# Patient Record
(demographics unavailable — no encounter records)

---

## 2025-07-08 NOTE — PHYSICAL EXAM
[2] : was Alek stage 2 [Scant] : scant [___] : [unfilled] [Goiter] : no goiter [Murmur] : no murmurs [2/6 Ejection Systolic Murmur] : no ejection systolic murmur  [Normal for Age] : was abnormal for age

## 2025-07-08 NOTE — CONSULT LETTER
[Dear  ___] : Dear  [unfilled], [Consult Letter:] : I had the pleasure of evaluating your patient, [unfilled]. [Please see my note below.] : Please see my note below. [Consult Closing:] : Thank you very much for allowing me to participate in the care of this patient.  If you have any questions, please do not hesitate to contact me. [Sincerely,] : Sincerely, [FreeTextEntry3] : Americo Key MD

## 2025-07-08 NOTE — REVIEW OF SYSTEMS
[Rash] : no rash [Skin Lesions] : no skin lesions [Joint Pains] : no arthralgias [Joint Swelling] : no joint swelling [Back Pain] : ~T no back pain [Muscle Aches] : no muscle aches [Chest Pain] : no chest pain [Eye Discharge] : no eye discharge [Redness] : no redness [Tachypnea] : no tachypnea [Cough] : no cough [Vomiting] : no vomiting [Diarrhea] : no diarrhea [Abdominal Pain] : no abdominal pain [Constipation] : no constipation [Emotional Problems] : no ~T emotional problems [Nasal Stuffiness] : no nasal congestion [Sore Throat] : no sore throat [Fainting] : no syncope [Headache] : no headache [Dizziness] : no dizziness [Cold Intolerance] : cold tolerant [Heat Intolerance] : heat tolerant

## 2025-07-08 NOTE — ASSESSMENT
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  Premature adrenarche is a condition, which we classically consider as mild and benign.  In such cases, it is important to make sure that this adrenarche is not heralding the onset of true central precocious puberty.  If there is no evidence of thelarche or growth spurt, our index of suspicion that this is in true central puberty is rather low. We would still like to screen for androgen excess and ordered androstenedione, a DHEA, DHEAS, and a free testosterone level.  Nonclassical CAH can also present outside the  period with premature adrenarche, 17-hydroxypregnenolone, and 17-hydroxyprogesterone assessed for it.  Bone age to assess skeletal maturity.  If there are markedly elevated levels of androgens, we would like to get an ultrasound to assess for an adrenal tumor producing excessive androgens.    We explained that in most young children before they enter true puberty, there may be signs of androgen effects such as pubic hair, and body odor that are independent of the activation of central puberty. This may be due to an increase in the adrenal androgens or enhanced sensitivity to normal levels of androgens. As you may know, gonadotropins are the hormones that bring on the activation of the pituitary gonadal axis and bring on true central puberty.   Plan:  Obtain lab work now before 9 am.  Obtain bone age now.   Will discuss results with dad through telephone visit.   16.1

## 2025-07-08 NOTE — HISTORY OF PRESENT ILLNESS
[FreeTextEntry2] :  COLIN JAMES 9 year old male referred by Dr. Samaniego   for early pubertal development.   Parents are present and concerned about early development of pubic hair. Father reports that he started developing pubic hair from 9/2024 and body odor from 12/2024

## 2025-07-21 NOTE — QUALITY MEASURES
[Classification of primary headache syndrome based on latest version of International Classification of  Headache Disorders was performed] : Classification of primary headache syndrome based on latest version of International Classification of Headache Disorders was performed: Yes [Functional disability based on clinical history and/or age appropriate disability scale assessed] : Functional disability based on clinical history and/or age appropriate disability scale assessed: Yes [Overuse of OTC and prescribed analgesics assessed] : Overuse of OTC and prescribed analgesics assessed: Yes [Lifestyle factors including diet, exercise and sleep hygiene discussed] : Lifestyle factors including diet, exercise and sleep hygiene discussed: Yes [Referral to behavioral health for frequent headaches discussed] : Referral to behavioral health for frequent headaches discussed: Yes [Treatment plan for headache including  pharmacological (abortive and preventive) and nonpharmacological (nutraceutical and bio-behavioral) interventions] : Treatment plan for headache including  pharmacological (abortive and preventive) and nonpharmacological (nutraceutical and bio-behavioral) interventions: Yes [Snore at night?] : Does your child snore at night? No [Complain of daytime sleepiness?] : Does your child complain of daytime sleepiness? No [SleepDisorders] : sleepwalking, sleep talking, night terrors

## 2025-07-21 NOTE — ASSESSMENT
[FreeTextEntry1] : Tramaine is a 9 year old, RH, male with premature adrenarche seen by Endocrine on 7/8/25 with slight vitamin D insufficiency (Vitamin D 29.7 on 7/19/25) who presents to Neurology for their initial visit with concerns of headaches, sleep disturbances (sleepwalking and night terrors), and syncope. Although patient's neurological exam is non-focal and meeting all developmental milestones which is reassuring there are red flags of headaches waking patient up from sleep and lack of family history of headaches so will obtain brain MRI to rule out structural lesions. Obtain ferritin levels. Father counseled to bring Ophthalmologist records for dilated fundoscopic exam to rule out vision issues leading to headaches and previous Cardiology records for syncopal events not related to headaches. Discussed monitoring for any additional pre-fainting episodes or new red flag symptoms. Additionally, the patient is advised to avoid known triggers, lie down at the first sign of symptoms to try prevent fainting, moderate exercise training, eating a higher salt diet, to help keep up blood volume, drinking plenty of fluids, to maintain blood volume, and wearing compression stockings. Counseled on recommend lifestyle modifications and vitamin B2 and magnesium supplementation to help manage his headaches. Written educational materials were provided. Recommended psychiatrist and therapists to help with mood and stress management. Written psych, CBT, and biofeedback resources provided. Counseled on Sleep Hygiene: stick to the same bedtime and wake time every day, even on weekends. Follow up in 1-2 months, or sooner if symptoms worsen.  I spent a total of 75 minutes on the date of the encounter chart reviewing, evaluating, coordination of care, counseling, and treating the patient.

## 2025-07-21 NOTE — PLAN
[FreeTextEntry1] : - Therapeutic Interventions: Recommend keeping a headache diary, the sleep schedule consistent, ensuring adequate hydration and maintaining a balanced diet. Also recommend taking Magnesium 100 mg BID (200 mg daily) and Vitamin B2 supplement 100 mg BID (200 mg daily) with food. - Counseled on not using abortives (Tylenol/Motrin) for no more than 2x/week. - Diagnostic Tests: As there are red flags will obtain MRI brain. Patient and father advised to bring records from ophthalmology appointment to rule out any vision issues relating to headaches. Patient and father advised to bring records from Cardiology appointment. Obtain ferritin levels - father reports recent Endo Labs on 7/19/25; Lionel tasked RN team to call lab to add on ferritin, if possible, otherwise with next PCP blood draw - Patient Education: Ensure the patient and parents understand the importance of hydration, sleep, and nutrition in managing recurring headaches. - Counseled on Sleep Hygiene - Maintain proper hydration and nutrition - Monitor for any additional fainting episodes/new symptoms - Compression stockings - Recommended psychiatrist and therapists to help with mood and stress management. Written psych, CBT, and biofeedback resources provided. - Follow up in 1-2 months, or sooner if symptoms worsen.

## 2025-07-21 NOTE — DEVELOPMENTAL MILESTONES
[Has a caring/supportive family] : has a caring/supportive family [Is doing well in school] : is doing well in school

## 2025-07-21 NOTE — PHYSICAL EXAM
[Well-appearing] : well-appearing [Normocephalic] : normocephalic [No dysmorphic facial features] : no dysmorphic facial features [No ocular abnormalities] : no ocular abnormalities [Lungs clear] : lungs clear [Heart sounds regular in rate and rhythm] : heart sounds regular in rate and rhythm [Soft] : soft [No deformities] : no deformities [Alert] : alert [Well related, good eye contact] : well related, good eye contact [Conversant] : conversant [Normal speech and language] : normal speech and language [Follows instructions well] : follows instructions well [Pupils reactive to light and accommodation] : pupils reactive to light and accommodation [Full extraocular movements] : full extraocular movements [No nystagmus] : no nystagmus [Normal facial sensation to light touch] : normal facial sensation to light touch [No facial asymmetry or weakness] : no facial asymmetry or weakness [Gross hearing intact] : gross hearing intact [Equal palate elevation] : equal palate elevation [Good shoulder shrug] : good shoulder shrug [Normal tongue movement] : normal tongue movement [Midline tongue, no fasciculations] : midline tongue, no fasciculations [Normal axial and appendicular muscle tone] : normal axial and appendicular muscle tone [Gets up on table without difficulty] : gets up on table without difficulty [No pronator drift] : no pronator drift [Normal finger tapping and fine finger movements] : normal finger tapping and fine finger movements [No abnormal involuntary movements] : no abnormal involuntary movements [5/5 strength in proximal and distal muscles of arms and legs] : 5/5 strength in proximal and distal muscles of arms and legs [Walks and runs well] : walks and runs well [Able to walk on heels] : able to walk on heels [Able to walk on toes] : able to walk on toes [2+ biceps] : 2+ biceps [Triceps] : triceps [Knee jerks] : knee jerks [Ankle jerks] : ankle jerks [No ankle clonus] : no ankle clonus [No dysmetria on FTNT] : no dysmetria on FTNT [Good walking balance] : good walking balance [Normal gait] : normal gait [Able to tandem well] : able to tandem well [Negative Romberg] : negative Romberg [II] : Mallampati Class: II [Saccadic and smooth pursuits intact] : saccadic and smooth pursuits intact [R handed] : R handed [Bilaterally] : bilaterally [de-identified] : No tenderness on palpation of the shoulders, occipital, temporal, frontal regions, or sinuses, no neck stiffness, unable to visualize optic discs on undilated eye exam due to lack of patient cooperation [de-identified] : hyperpigmented macule on the right chest near neck as birthmark [de-identified] : Localized to LT and vibration

## 2025-07-21 NOTE — CONSULT LETTER
[Dear  ___] : Dear  [unfilled], [Please see my note below.] : Please see my note below. [Sincerely,] : Sincerely, [FreeTextEntry3] : Susy Harrington,  Attending Child Neurologist/Epileptologist

## 2025-07-21 NOTE — REASON FOR VISIT
[Initial Consultation] : an initial consultation for [Medical Records] : medical records [Headache] : headache [Patient] : patient [Father] : father [Other: ______] : provided by SILVERIO [Interpreters_IDNumber] : [id number] [Interpreters_FullName] : Bart [TWNoteComboBox1] : Maced[place]

## 2025-07-21 NOTE — HISTORY OF PRESENT ILLNESS
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 Accompanied by father today.  Headaches first started around 12/2024, without any inciting events of trauma or infection. Headaches have not really been increased in frequency or worsening per father. Patient reports headaches are slightly better since onset in 12/2024.   He reports "hitting" 5/10 headaches in the frontal region without radiation occurs 2-5x/week and usually self-resolve after 10-20 minutes. Per father, headaches only in the morning and headaches waking patient up from sleep. Headaches never occur at night per father. Denies autonomic (eye redness, tearing, sweating) symptoms, vision changes, weakness, paresthesias, changes in speech, LOC, positional changes, dizziness, lightheadedness, vertigo, any aura, triggers, emesis, nausea, photophobia, phonophobia, allergies. Sleeping makes headache better. Nothing makes headache worsen. Denies missing any school due to headaches. Has not tried any medications for the headaches. Denies history of meningitis, trauma, previous neurosurgical procedures, or family history of headache.   Hydration: 2 x 16 oz/day of water Caffeine: sometimes soda, denies coffee or tea Diet: denies skipping meal but picky eating (does not like vegetables or fruits) Sleeps at 9 PM, wakes up at 7 AM, denies napping, choking, gasping, snoring, or apneas, does usually sleeps through the night but sometimes will wake up and complain of pains usually of the headache, and feels well rested in morning Exercise: >30 minutes/day, bikes and basketball Life Stressors: denies bullying or recent stressors, never saw Psychiatrist or therapist Mood: "little nervous"; father denies anxiety or depression School: Will be starting 5th grade, doing well, per father has group classes in school for attention concentration problems, unsure if it is an IEP or 504 Screen Time: 6-7 hours/day Eyes: last week had last dilated funduscopic exam optic disc was reportedly normal but needed glasses  Father reports patient has episodes of waking up screaming and crying without recollection of the episodes. One episode of sleepwalking where he walked into the kitchen instead bathroom peed on trash can instead of the toilet. Father also states he had 2 brief episodes of loss of consciousness, for least than 1 minutes, resolved after laying people down and lifting up legs. Both episodes father reports he was standing up from sitting and patient told father that he didn't feel good, that he felt lightheaded before collapsing. Previously had EKG done by Cardiology was unremarkable per father.  Denies fever, chill, URI symptoms, emesis, diarrhea, rash, sick contacts.  Denies autism, developmental delays, staring episodes, myoclonus, convulsions, stiffening, loss of tone, waking up with blood on pillow, unexplained myalgias, febrile seizures.  Birth History: per father "navel bulging out with some air in the stomach for 2 days in the hospital after being born at 8 months but denies NICU", denies NICU stays, met developmental milestones, has hyperpigmented macule on the right chest near neck as birthmarks Developmental History: no concerns, denies PT, OT, Speech, or MARVIN therapy Past Medical History: premature adrenarche seen by Endocrine on 7/8/25 with slight vitamin D insufficiency (Vitamin D 29.7 on 7/19/25) Past Surgical History: denies Medications: denies Allergies: denies Social History: Lives at home with parents, 2 brothers - healthy   Family History: Denies family history of sleepwalking, sleep talking, seizures, developmental delays, autism, headaches, or other neurological disorders.   Father reports family will be going on vacation to [place] from tomorrow until 7/31/25.

## 2025-07-21 NOTE — REVIEW OF SYSTEMS
[Headache] : headache [Birthmarks] : birthmarks [Normal] : Genitourinary [Anxiety] : anxiety [Rash] : no rash [Depression] : no depression [de-identified] : hyperpigmented macule on the right chest near neck as birthmark